# Patient Record
Sex: MALE | Race: WHITE | NOT HISPANIC OR LATINO | Employment: FULL TIME | URBAN - METROPOLITAN AREA
[De-identification: names, ages, dates, MRNs, and addresses within clinical notes are randomized per-mention and may not be internally consistent; named-entity substitution may affect disease eponyms.]

---

## 2017-01-27 ENCOUNTER — LAB VISIT (OUTPATIENT)
Dept: LAB | Facility: HOSPITAL | Age: 32
End: 2017-01-27
Attending: INTERNAL MEDICINE
Payer: COMMERCIAL

## 2017-01-27 ENCOUNTER — OFFICE VISIT (OUTPATIENT)
Dept: FAMILY MEDICINE | Facility: CLINIC | Age: 32
End: 2017-01-27
Payer: COMMERCIAL

## 2017-01-27 VITALS
HEIGHT: 73 IN | WEIGHT: 175.5 LBS | BODY MASS INDEX: 23.26 KG/M2 | HEART RATE: 76 BPM | TEMPERATURE: 98 F | SYSTOLIC BLOOD PRESSURE: 116 MMHG | DIASTOLIC BLOOD PRESSURE: 70 MMHG

## 2017-01-27 DIAGNOSIS — Z11.3 SCREENING FOR STD (SEXUALLY TRANSMITTED DISEASE): Primary | ICD-10-CM

## 2017-01-27 DIAGNOSIS — Z11.3 SCREENING FOR STD (SEXUALLY TRANSMITTED DISEASE): ICD-10-CM

## 2017-01-27 PROCEDURE — 99999 PR PBB SHADOW E&M-NEW PATIENT-LVL III: CPT | Mod: PBBFAC,,, | Performed by: INTERNAL MEDICINE

## 2017-01-27 PROCEDURE — 1159F MED LIST DOCD IN RCRD: CPT | Mod: S$GLB,,, | Performed by: INTERNAL MEDICINE

## 2017-01-27 PROCEDURE — 86695 HERPES SIMPLEX TYPE 1 TEST: CPT

## 2017-01-27 PROCEDURE — 86703 HIV-1/HIV-2 1 RESULT ANTBDY: CPT

## 2017-01-27 PROCEDURE — 87340 HEPATITIS B SURFACE AG IA: CPT

## 2017-01-27 PROCEDURE — 86695 HERPES SIMPLEX TYPE 1 TEST: CPT | Mod: 59

## 2017-01-27 PROCEDURE — 86803 HEPATITIS C AB TEST: CPT

## 2017-01-27 PROCEDURE — 99203 OFFICE O/P NEW LOW 30 MIN: CPT | Mod: S$GLB,,, | Performed by: INTERNAL MEDICINE

## 2017-01-27 PROCEDURE — 87591 N.GONORRHOEAE DNA AMP PROB: CPT

## 2017-01-27 PROCEDURE — 36415 COLL VENOUS BLD VENIPUNCTURE: CPT | Mod: PO

## 2017-01-27 PROCEDURE — 86592 SYPHILIS TEST NON-TREP QUAL: CPT

## 2017-01-27 PROCEDURE — 86696 HERPES SIMPLEX TYPE 2 TEST: CPT | Mod: 59

## 2017-01-27 PROCEDURE — 86706 HEP B SURFACE ANTIBODY: CPT

## 2017-01-27 NOTE — PROGRESS NOTES
Subjective:        Patient ID: Lei Estrada is a 31 y.o. male.    Chief Complaint: Rash (penis area) and wants std testing    HPI   Lei Estrada presents for STI testing.  Pt reports he had a rash on his penis that consisted of small red dots.  They looked like little pimples but were not raised.  He denies vesicles, ulcers, drainage, bleeding.  They were mildly tender but he denies pain, itching.  He denies penile discharge, dysuria, blood in urine, cloudy urine.  The rash resolved spontaneously after about 1 week.  This has happened twice.    Pt is sexually active with his girlfriend who recently had a red itchy rash along her lower abdomen for which she was given an antifungal cream by her doctor.  Pt was last tested for STIs 1 year ago, including HSV, and everything was negative per his report.    Currently the rash is resolved.    Review of Systems  as per HPI      Objective:        Vitals:    01/27/17 1135   BP: 116/70   Pulse: 76   Temp: 97.9 °F (36.6 °C)     Physical Exam   Constitutional: He is oriented to person, place, and time. He appears well-developed and well-nourished. No distress.   Cardiovascular: Normal rate.    Pulmonary/Chest: Effort normal. No respiratory distress.   Neurological: He is alert and oriented to person, place, and time.   Psychiatric: He has a normal mood and affect. His behavior is normal. Judgment and thought content normal.   Vitals reviewed.          Assessment:         1. Screening for STD (sexually transmitted disease)              Plan:         Lei was seen today for rash and wants std testing.    Diagnoses and all orders for this visit:    Screening for STD (sexually transmitted disease): STI testing today.  -     C. trachomatis/N. gonorrhoeae by AMP DNA Urine  -     HIV-1 and HIV-2 antibodies; Future  -     HERPES SIMPLEX 1 & 2 IGM; Future  -     HERPES SIMPLEX 1&2 IGG; Future  -     RPR; Future  -     Hepatitis B surface antibody; Future  -     Hepatitis B surface antigen;  Future  -     Hepatitis C antibody; Future            Follow up pending lab results.

## 2017-01-27 NOTE — MR AVS SNAPSHOT
"    [facility]  [address] [facility], [address]  [facility] [address]  Phone: [phone]  Fax: [phone]                  Lei Estrada   2017 11:40 AM   Office Visit    Description:  Male : 1985   Provider:  Sonia Landa MD   Department:  [facility]           Reason for Visit     Rash     wants std testing           Diagnoses this Visit        Comments    Screening for STD (sexually transmitted disease)    -  Primary            To Do List           Goals (5 Years of Data)     None      Follow-Up and Disposition     Return for Follow up pending lab results.      [facility]     [facility] -  Assistance  Registered nurses in the [facility] provide clinical advisement, health education, appointment booking, and other advisory services.  Call for this free service at [phone].             Medications           Message regarding Medications     Verify the changes and/or additions to your medication regime listed below are the same as discussed with your clinician today.  If any of these changes or additions are incorrect, please notify your healthcare provider.             Verify that the below list of medications is an accurate representation of the medications you are currently taking.  If none reported, the list may be blank. If incorrect, please contact your healthcare provider. Carry this list with you in case of emergency.                Clinical Reference Information           Vital Signs - Last Recorded  Most recent update: 2017 11:38 AM by Neha Hinojosa MA    BP Pulse Temp Ht Wt BMI    116/70 (BP Location: Right arm) 76 97.9 °F (36.6 °C) 6' 1" (1.854 m) 79.6 kg (175 lb 7.8 oz) 23.15 kg/m2      Blood Pressure          Most Recent Value    BP  116/70      Allergies as of 2017     No Known Allergies      Immunizations Administered on Date of Encounter - 2017     None      Orders Placed During " Today's Visit      Normal Orders This Visit    C. trachomatis/N. gonorrhoeae by AMP DNA Urine     Future Labs/Procedures Expected by Expires    Hepatitis B surface antibody  1/27/2017 3/28/2018    Hepatitis B surface antigen  1/27/2017 3/28/2018    Hepatitis C antibody  1/27/2017 3/28/2018    HERPES SIMPLEX 1 & 2 IGM  1/27/2017 3/28/2018    HERPES SIMPLEX 1&2 IGG  1/27/2017 3/28/2018    HIV-1 and HIV-2 antibodies  1/27/2017 3/28/2018    RPR  1/27/2017 3/28/2018

## 2017-01-28 LAB — RPR SER QL: NORMAL

## 2017-01-30 LAB
C TRACH DNA SPEC QL NAA+PROBE: NEGATIVE
HBV SURFACE AB SER-ACNC: NEGATIVE M[IU]/ML
HBV SURFACE AG SERPL QL IA: NEGATIVE
HCV AB SERPL QL IA: NEGATIVE
HIV 1+2 AB+HIV1 P24 AG SERPL QL IA: NEGATIVE
N GONORRHOEA DNA SPEC QL NAA+PROBE: NEGATIVE

## 2017-01-31 LAB
HSV1 IGG SERPL QL IA: POSITIVE
HSV2 IGG SERPL QL IA: NEGATIVE

## 2017-02-01 LAB — HSV1+2 IGM SER IA-ACNC: <0.9 INDEX

## 2017-04-28 ENCOUNTER — PATIENT MESSAGE (OUTPATIENT)
Dept: FAMILY MEDICINE | Facility: CLINIC | Age: 32
End: 2017-04-28

## 2017-04-28 DIAGNOSIS — A60.00 GENITAL HERPES SIMPLEX, UNSPECIFIED SITE: Primary | ICD-10-CM

## 2017-04-28 RX ORDER — VALACYCLOVIR HYDROCHLORIDE 500 MG/1
TABLET, FILM COATED ORAL
Qty: 30 TABLET | Refills: 3 | Status: SHIPPED | OUTPATIENT
Start: 2017-04-28

## 2018-12-05 ENCOUNTER — OFFICE VISIT (OUTPATIENT)
Dept: FAMILY MEDICINE | Facility: CLINIC | Age: 33
End: 2018-12-05
Payer: COMMERCIAL

## 2018-12-05 VITALS
HEIGHT: 73 IN | WEIGHT: 174.81 LBS | TEMPERATURE: 99 F | SYSTOLIC BLOOD PRESSURE: 120 MMHG | BODY MASS INDEX: 23.17 KG/M2 | DIASTOLIC BLOOD PRESSURE: 70 MMHG | HEART RATE: 62 BPM

## 2018-12-05 DIAGNOSIS — L72.0 INCLUSION CYST: Primary | ICD-10-CM

## 2018-12-05 DIAGNOSIS — Z00.00 ROUTINE GENERAL MEDICAL EXAMINATION AT A HEALTH CARE FACILITY: ICD-10-CM

## 2018-12-05 PROCEDURE — 3008F BODY MASS INDEX DOCD: CPT | Mod: CPTII,S$GLB,, | Performed by: FAMILY MEDICINE

## 2018-12-05 PROCEDURE — 99203 OFFICE O/P NEW LOW 30 MIN: CPT | Mod: S$GLB,,, | Performed by: FAMILY MEDICINE

## 2018-12-05 PROCEDURE — 99999 PR PBB SHADOW E&M-EST. PATIENT-LVL III: CPT | Mod: PBBFAC,,, | Performed by: FAMILY MEDICINE

## 2018-12-05 NOTE — PROGRESS NOTES
"Subjective:     Patient ID: Lei Estrada is a 33 y.o. male.    Chief Complaint: Mass (right testicle)    HPI mass of external right testicle present for 2 years- occasionally it gets irritated or itchy. Not getting larger,   Review of Systems  per HPI  Objective:      Physical Exam   Constitutional: He appears well-developed and well-nourished.   Genitourinary: Penis normal.   Genitourinary Comments: Small 2 mm inclusion cyst of rt scrotum skin.   Re-assured pt this appears benign and does not appear to be a wart /condyloma.    Nursing note and vitals reviewed.      Assessment:     Lei was seen today for mass.    Diagnoses and all orders for this visit:    Inclusion cyst    Routine general medical examination at a health care facility  -     CBC auto differential; Future  -     Comprehensive metabolic panel; Future  -     Lipid panel; Future  -     TSH; Future  -     HIV 1/2 Ag/Ab (4th Gen); Future    labs ordered for a PE. Recommend he have labs done and then return for a full PE at his convenience.   I also offered for him to return and I could remove the inclusion cyst  Although I explained that he could most likely open this on his own with a sterile need and gently de -roof the lesion and the "jon" would most likely pop out . He feels comfortable trying on his own to remove it.   "

## 2024-05-28 ENCOUNTER — OFFICE VISIT (OUTPATIENT)
Dept: PRIMARY CARE CLINIC | Facility: CLINIC | Age: 39
End: 2024-05-28
Payer: COMMERCIAL

## 2024-05-28 VITALS
HEIGHT: 74 IN | WEIGHT: 189.13 LBS | SYSTOLIC BLOOD PRESSURE: 120 MMHG | TEMPERATURE: 98 F | HEART RATE: 68 BPM | BODY MASS INDEX: 24.27 KG/M2 | DIASTOLIC BLOOD PRESSURE: 62 MMHG | OXYGEN SATURATION: 97 %

## 2024-05-28 DIAGNOSIS — Z00.00 ROUTINE GENERAL MEDICAL EXAMINATION AT A HEALTH CARE FACILITY: ICD-10-CM

## 2024-05-28 DIAGNOSIS — F33.1 MODERATE RECURRENT MAJOR DEPRESSION: Primary | ICD-10-CM

## 2024-05-28 DIAGNOSIS — Z13.31 POSITIVE DEPRESSION SCREENING: ICD-10-CM

## 2024-05-28 PROCEDURE — 99999 PR PBB SHADOW E&M-EST. PATIENT-LVL III: CPT | Mod: PBBFAC,,, | Performed by: FAMILY MEDICINE

## 2024-05-28 PROCEDURE — 1160F RVW MEDS BY RX/DR IN RCRD: CPT | Mod: CPTII,S$GLB,, | Performed by: FAMILY MEDICINE

## 2024-05-28 PROCEDURE — 3008F BODY MASS INDEX DOCD: CPT | Mod: CPTII,S$GLB,, | Performed by: FAMILY MEDICINE

## 2024-05-28 PROCEDURE — 1159F MED LIST DOCD IN RCRD: CPT | Mod: CPTII,S$GLB,, | Performed by: FAMILY MEDICINE

## 2024-05-28 PROCEDURE — 99203 OFFICE O/P NEW LOW 30 MIN: CPT | Mod: S$GLB,,, | Performed by: FAMILY MEDICINE

## 2024-05-28 PROCEDURE — 3074F SYST BP LT 130 MM HG: CPT | Mod: CPTII,S$GLB,, | Performed by: FAMILY MEDICINE

## 2024-05-28 PROCEDURE — 3078F DIAST BP <80 MM HG: CPT | Mod: CPTII,S$GLB,, | Performed by: FAMILY MEDICINE

## 2024-05-28 RX ORDER — BUPROPION HYDROCHLORIDE 150 MG/1
150 TABLET ORAL DAILY
Qty: 30 TABLET | Refills: 11 | Status: SHIPPED | OUTPATIENT
Start: 2024-05-28 | End: 2025-05-28

## 2024-05-28 NOTE — ASSESSMENT & PLAN NOTE
I have reviewed the positive depression score which warrants active treatment with psychotherapy and/or medications.    Start Wellbutrin 150 daily

## 2024-05-28 NOTE — PROGRESS NOTES
"      Assessment:     1. Moderate recurrent major depression    2. Positive depression screening          Plan:     Moderate recurrent major depression  Multifactorial,  from partner of 12 yrs, but feels has tended toward mild depression since young, mild difficulty w focus , "white knuckled" through things. Never hospitalized, no plan suicide. Does smoke & drink but not daily, does not affect his ability to hold a job. Never took med, open to try now after recommendation by his therapist    Continue w Therapist Fartun Wright q wk   [place] [place] [place]    Start Wellbutrin 150 daily  See me in 1-2 mo w labs prior for general physical    Positive depression screening  I have reviewed the positive depression score which warrants active treatment with psychotherapy and/or medications.    Start Wellbutrin 150 daily      New pt, 19 min spent in discussion his symptoms & medical treatment options      HPI: Lei Estrada is a 38 y.o. male with is here today for discussion on focus & feeling down    MEDS : none    No results found for: "HGBA1C"  No results found for: "MICALBCREAT"  No results found for: "LDLCALC", "CHOL", "HDL", "TRIG"    Lab Results   Component Value Date     (L) 04/27/2021    K 3.8 04/27/2021    CO2 29 04/27/2021    BUN 7.0 (L) 04/27/2021    CREATININE 0.93 04/27/2021    CALCIUM 9.1 04/27/2021    ALBUMIN 4.8 04/27/2021    BILITOT 0.9 04/27/2021    AST 30 04/27/2021    ALT 19 04/27/2021    ANIONGAP 5 (L) 04/27/2021    ESTGFRAFRICA >105 04/27/2021    HEPCAB Negative 01/27/2017       No results found for: "LH", "FSH", "TOTALTESTOST", "PROGESTERONE", "ESTRADIOL", "[id number]", "EOLVSHOX81", "FERRITIN", "IRON", "TRANSFERRIN", "TIBC", "FESATURATED", "ZINC"      Past Medical History:   Diagnosis Date    Asthma      History reviewed. No pertinent surgical history.  Vitals:    05/28/24 0908   BP: 120/62   Pulse: 68   Temp: 98.1 °F (36.7 °C)   TempSrc: Oral   SpO2: 97%   Weight: 85.8 kg (189 " "lb 2.5 oz)   Height: 6' 2" (1.88 m)   PainSc: 0-No pain     Wt Readings from Last 5 Encounters:   05/28/24 85.8 kg (189 lb 2.5 oz)   12/05/18 79.3 kg (174 lb 13.2 oz)   01/27/17 79.6 kg (175 lb 7.8 oz)     Objective:   Physical Exam  Psychiatric:         Mood and Affect: Mood normal.         Behavior: Behavior normal.         Thought Content: Thought content normal.         Judgment: Judgment normal.                                     "

## 2024-05-28 NOTE — ASSESSMENT & PLAN NOTE
"Multifactorial,  from partner of 12 yrs, but feels has tended toward mild depression since young, mild difficulty w focus , "white knuckled" through things. Never hospitalized, no plan suicide. Does smoke & drink but not daily, does not affect his ability to hold a job. Never took med, open to try now after recommendation by his therapist    Continue w Therapist Fartun Wright q wk   [place]    Start Wellbutrin 150 daily  See me in 1-2 mo w labs prior for general physical  "

## 2024-06-10 ENCOUNTER — LAB VISIT (OUTPATIENT)
Dept: LAB | Facility: HOSPITAL | Age: 39
End: 2024-06-10
Attending: FAMILY MEDICINE
Payer: COMMERCIAL

## 2024-06-10 DIAGNOSIS — Z00.00 ROUTINE GENERAL MEDICAL EXAMINATION AT A HEALTH CARE FACILITY: ICD-10-CM

## 2024-06-10 LAB
ALT SERPL W/O P-5'-P-CCNC: 17 U/L (ref 10–44)
ANION GAP SERPL CALC-SCNC: 10 MMOL/L (ref 8–16)
AST SERPL-CCNC: 21 U/L (ref 10–40)
BUN SERPL-MCNC: 10 MG/DL (ref 6–20)
CALCIUM SERPL-MCNC: 9.7 MG/DL (ref 8.7–10.5)
CHLORIDE SERPL-SCNC: 104 MMOL/L (ref 95–110)
CHOLEST SERPL-MCNC: 143 MG/DL (ref 120–199)
CHOLEST/HDLC SERPL: 2.8 {RATIO} (ref 2–5)
CO2 SERPL-SCNC: 25 MMOL/L (ref 23–29)
CREAT SERPL-MCNC: 0.9 MG/DL (ref 0.5–1.4)
ERYTHROCYTE [DISTWIDTH] IN BLOOD BY AUTOMATED COUNT: 11.8 % (ref 11.5–14.5)
EST. GFR  (NO RACE VARIABLE): >60 ML/MIN/1.73 M^2
GLUCOSE SERPL-MCNC: 87 MG/DL (ref 70–110)
HCT VFR BLD AUTO: 42.1 % (ref 40–54)
HDLC SERPL-MCNC: 51 MG/DL (ref 40–75)
HDLC SERPL: 35.7 % (ref 20–50)
HGB BLD-MCNC: 13.8 G/DL (ref 14–18)
LDLC SERPL CALC-MCNC: 81.2 MG/DL (ref 63–159)
MCH RBC QN AUTO: 31.4 PG (ref 27–31)
MCHC RBC AUTO-ENTMCNC: 32.8 G/DL (ref 32–36)
MCV RBC AUTO: 96 FL (ref 82–98)
NONHDLC SERPL-MCNC: 92 MG/DL
PLATELET # BLD AUTO: 268 K/UL (ref 150–450)
PMV BLD AUTO: 11.9 FL (ref 9.2–12.9)
POTASSIUM SERPL-SCNC: 4.2 MMOL/L (ref 3.5–5.1)
RBC # BLD AUTO: 4.4 M/UL (ref 4.6–6.2)
SODIUM SERPL-SCNC: 139 MMOL/L (ref 136–145)
TRIGL SERPL-MCNC: 54 MG/DL (ref 30–150)
WBC # BLD AUTO: 5 K/UL (ref 3.9–12.7)

## 2024-06-10 PROCEDURE — 85027 COMPLETE CBC AUTOMATED: CPT | Performed by: FAMILY MEDICINE

## 2024-06-10 PROCEDURE — 80048 BASIC METABOLIC PNL TOTAL CA: CPT | Performed by: FAMILY MEDICINE

## 2024-06-10 PROCEDURE — 84450 TRANSFERASE (AST) (SGOT): CPT | Performed by: FAMILY MEDICINE

## 2024-06-10 PROCEDURE — 84460 ALANINE AMINO (ALT) (SGPT): CPT | Performed by: FAMILY MEDICINE

## 2024-06-10 PROCEDURE — 36415 COLL VENOUS BLD VENIPUNCTURE: CPT | Mod: PN | Performed by: FAMILY MEDICINE

## 2024-06-10 PROCEDURE — 80061 LIPID PANEL: CPT | Performed by: FAMILY MEDICINE

## 2024-06-20 ENCOUNTER — OFFICE VISIT (OUTPATIENT)
Dept: PRIMARY CARE CLINIC | Facility: CLINIC | Age: 39
End: 2024-06-20
Payer: COMMERCIAL

## 2024-06-20 VITALS
HEART RATE: 73 BPM | HEIGHT: 74 IN | SYSTOLIC BLOOD PRESSURE: 110 MMHG | WEIGHT: 186.5 LBS | OXYGEN SATURATION: 98 % | TEMPERATURE: 99 F | BODY MASS INDEX: 23.93 KG/M2 | DIASTOLIC BLOOD PRESSURE: 68 MMHG

## 2024-06-20 DIAGNOSIS — Z00.00 ROUTINE GENERAL MEDICAL EXAMINATION AT A HEALTH CARE FACILITY: Primary | ICD-10-CM

## 2024-06-20 DIAGNOSIS — F33.1 MODERATE RECURRENT MAJOR DEPRESSION: ICD-10-CM

## 2024-06-20 PROBLEM — Z13.31 POSITIVE DEPRESSION SCREENING: Status: RESOLVED | Noted: 2024-05-28 | Resolved: 2024-06-20

## 2024-06-20 PROCEDURE — 99999 PR PBB SHADOW E&M-EST. PATIENT-LVL III: CPT | Mod: PBBFAC,,, | Performed by: FAMILY MEDICINE

## 2024-06-20 PROCEDURE — 3074F SYST BP LT 130 MM HG: CPT | Mod: CPTII,S$GLB,, | Performed by: FAMILY MEDICINE

## 2024-06-20 PROCEDURE — 3078F DIAST BP <80 MM HG: CPT | Mod: CPTII,S$GLB,, | Performed by: FAMILY MEDICINE

## 2024-06-20 PROCEDURE — 3008F BODY MASS INDEX DOCD: CPT | Mod: CPTII,S$GLB,, | Performed by: FAMILY MEDICINE

## 2024-06-20 PROCEDURE — 1160F RVW MEDS BY RX/DR IN RCRD: CPT | Mod: CPTII,S$GLB,, | Performed by: FAMILY MEDICINE

## 2024-06-20 PROCEDURE — 99395 PREV VISIT EST AGE 18-39: CPT | Mod: S$GLB,,, | Performed by: FAMILY MEDICINE

## 2024-06-20 PROCEDURE — 1159F MED LIST DOCD IN RCRD: CPT | Mod: CPTII,S$GLB,, | Performed by: FAMILY MEDICINE

## 2024-06-20 RX ORDER — BUPROPION HYDROCHLORIDE 300 MG/1
300 TABLET ORAL DAILY
Qty: 30 TABLET | Refills: 11 | Status: SHIPPED | OUTPATIENT
Start: 2024-06-20 | End: 2025-06-20

## 2024-06-20 NOTE — ASSESSMENT & PLAN NOTE
"Multifactorial,  from partner of 12 yrs, but feels has tended toward mild depression since young, mild difficulty w focus , "white knuckled" through things. Never hospitalized, no plan suicide. Does smoke & drink but not daily, does not affect his ability to hold a job. Never took med, open to try now after recommendation by his therapist    Continue w Therapist Fartun Wright q wk   [place]     Wellbutrin 150 daily works ok, but not noticed much difference, but having more insomnia, awakening more (usually 11-12 - 6 am). But unsure if it's side effect    Increase Wellbutrin 300 daily. If not better, consider Virtual visit to discuss SSRI med    Asking about focus --    ============================================  TIPS FOR ADD:  ============================================    Supplements to try :  OMEGA 3 FATTY ACIDS (fish oil) boosts DOPAMINE  the brain neurotransmitter needed for  working memory, behavior, motivation. Foods w this - Fish, Walnuts, Flaxseed, Basil, Eggs, Brussel Sprouts, Seaweed, Soybeans, Spinach, Canola Oil, Broccoli, Cashews    MAGNESIUM 400 nightly can help with relaxation. Ex - Chillax by [employer] brand    FANTASTIC BOOK (it's old so get an inexpensive used one online)= ADD Success Stories by Neil Ivory  Visual Learning  The Creative Act : a way of being, [employer] producer    Try stopping one of these for a week at a time & see how your brain responds -  Cut out sugar, carbohydrates, processed foods. [email or web address] podcast on "Eating for ADD"    Spend 5- 10 min a day making a Daily To-Do list    Try the Pomodoro technique (look it up) of focused work for 25 min, then take 5 min break    Get restful uninterrupted SLEEP every night     WALK IN NATURE 10 minutes EVERY day. More daily exercise benefits you exponentially.    On your phone, under settings, TURN OFF ALL NOTIFICATIONS     Keep your PHONE FACE DOWN when you're studying or working or charging it,  " "as not to be distracted on the task in front of you.     TRY THIS & SEE HOW PRODUCTIVE YOU ARE - Under Settings, SCREEN TIME, limit social media & texting to ONE HOUR a day.     Be aware of your total screen time that your spend on your phone on a daily basis. If you're trying to complete a task, be aware that you were distracted the amount of time that you were on your phone.     Avoid alcohol, marijuana, vaping, tobacco, drugs - these all hit the "reward center" of your brain but ultimately depresses your brain function  ===========================================    If you'd like to try a nonstimulant/ noncontrolled medicine for ADD  , like  Strattera & Wellbutrin  we can try this    ======    SLEEP HYGIENE-   After 3 pm - Avoid caffeine (coffee, tea, soft drinks ,energy drinks)   After 6 pm - Avoid liquids (so you're not waking up to urinate)  After 8 pm - Avoid screens that emit light & stimulate your brain. Don't look at phone, computer or TV  It's ok to listen to background noise machine (waves, rain, audio books where voice is low)  Keep your room dark & use an alarm clock that isn't bright.   Do not take a nap  Wake up every morning & step out of the door. Walk for 15 min, then turn around & walk home.  EVERY DAY . This helps to  Exhaust your body  to sleep    Try to stay in your bed for 7-8 hours per night, instead of getting out of bed (which awakens your body when you're standing upright &  wakes up your brain, affecting your normal circadian rhythm)  ================    "

## 2024-06-20 NOTE — PROGRESS NOTES
"      Assessment:     1. Routine general medical examination at a health care facility    2. Moderate recurrent major depression      Plan:     Routine general medical examination at a health care facility  MOVE more, sit less  Eat more food grown from the earth (picked from trees or out of the ground)  High fiber, good fat (avocado, olive oil, nuts) foods  If you urinate more than 2 times a night (prostate symptoms) let me know  COLON CANCER screening starting at 46 yo  Follow up yearly with LABS ONE WEEK PRIOR so we can discuss at your visit      Moderate recurrent major depression  Multifactorial,  from partner of 12 yrs, but feels has tended toward mild depression since young, mild difficulty w focus , "white knuckled" through things. Never hospitalized, no plan suicide. Does smoke & drink but not daily, does not affect his ability to hold a job. Never took med, open to try now after recommendation by his therapist    Continue w Therapist Fartun Wright q wk   [place] [place]     Wellbutrin 150 daily works ok, but not noticed much difference, but having more insomnia, awakening more (usually 11-12 - 6 am). But unsure if it's side effect    Increase Wellbutrin 300 daily. If not better, consider Virtual visit to discuss SSRI med    Asking about focus --    ============================================  TIPS FOR ADD:  ============================================    Supplements to try :  OMEGA 3 FATTY ACIDS (fish oil) boosts DOPAMINE  the brain neurotransmitter needed for  working memory, behavior, motivation. Foods w this - Fish, Walnuts, Flaxseed, Basil, Eggs, Brussel Sprouts, Seaweed, Soybeans, Spinach, Canola Oil, Broccoli, Cashews    MAGNESIUM 400 nightly can help with relaxation. Ex - Chillax by [employer] brand    FANTASTIC BOOK (it's old so get an inexpensive used one online)= ADD Success Stories by Neil Ivory  Visual Learning  The Creative Act : a way of being, Timothy Siegel music " "    Try stopping one of these for a week at a time & see how your brain responds -  Cut out sugar, carbohydrates, processed foods. [email or web address] podcast on "Eating for ADD"    Spend 5- 10 min a day making a Daily To-Do list    Try the Pomodoro technique (look it up) of focused work for 25 min, then take 5 min break    Get restful uninterrupted SLEEP every night     WALK IN NATURE 10 minutes EVERY day. More daily exercise benefits you exponentially.    On your phone, under settings, TURN OFF ALL NOTIFICATIONS     Keep your PHONE FACE DOWN when you're studying or working or charging it,  as not to be distracted on the task in front of you.     TRY THIS & SEE HOW PRODUCTIVE YOU ARE - Under Settings, SCREEN TIME, limit social media & texting to ONE HOUR a day.     Be aware of your total screen time that your spend on your phone on a daily basis. If you're trying to complete a task, be aware that you were distracted the amount of time that you were on your phone.     Avoid alcohol, marijuana, vaping, tobacco, drugs - these all hit the "reward center" of your brain but ultimately depresses your brain function  ===========================================    If you'd like to try a nonstimulant/ noncontrolled medicine for ADD  , like  Strattera & Wellbutrin  we can try this    ======    SLEEP HYGIENE-   After 3 pm - Avoid caffeine (coffee, tea, soft drinks ,energy drinks)   After 6 pm - Avoid liquids (so you're not waking up to urinate)  After 8 pm - Avoid screens that emit light & stimulate your brain. Don't look at phone, computer or TV  It's ok to listen to background noise machine (waves, rain, audio books where voice is low)  Keep your room dark & use an alarm clock that isn't bright.   Do not take a nap  Wake up every morning & step out of the door. Walk for 15 min, then turn around & walk home.  EVERY DAY . This helps to  Exhaust your body  to sleep    Try to stay in your bed for 7-8 hours per night, " "instead of getting out of bed (which awakens your body when you're standing upright &  wakes up your brain, affecting your normal circadian rhythm)  ================          HPI: Lei Estrada is a 38 y.o. male with is here today for general exam.     Denies chest pain, shortness of breath    Current Outpatient Medications   Medication Instructions    buPROPion (WELLBUTRIN XL) 300 mg, Oral, Daily       No results found for: "HGBA1C"  No results found for: "MICALBCREAT"  Lab Results   Component Value Date    LDLCALC 81.2 06/10/2024    CHOL 143 06/10/2024    HDL 51 06/10/2024    TRIG 54 06/10/2024       Lab Results   Component Value Date     06/10/2024    K 4.2 06/10/2024     06/10/2024    CO2 25 06/10/2024    GLU 87 06/10/2024    BUN 10 06/10/2024    CREATININE 0.9 06/10/2024    CALCIUM 9.7 06/10/2024    ALBUMIN 4.8 04/27/2021    BILITOT 0.9 04/27/2021    AST 21 06/10/2024    ALT 17 06/10/2024    ANIONGAP 10 06/10/2024    ESTGFRAFRICA >105 04/27/2021    WBC 5.00 06/10/2024    HGB 13.8 (L) 06/10/2024    HCT 42.1 06/10/2024    MCV 96 06/10/2024     06/10/2024    HEPCAB Negative 01/27/2017       No results found for: "LH", "FSH", "TOTALTESTOST", "PROGESTERONE", "ESTRADIOL", "NEALOGST14LO", "[id number]", "FERRITIN", "IRON", "TRANSFERRIN", "TIBC", "FESATURATED", "ZINC"      Past Medical History:   Diagnosis Date    Asthma      History reviewed. No pertinent surgical history.  Vitals:    06/20/24 1404   BP: 110/68   Pulse: 73   Temp: 98.7 °F (37.1 °C)   TempSrc: Oral   SpO2: 98%   Weight: 84.6 kg (186 lb 8.2 oz)   Height: 6' 2" (1.88 m)   PainSc:   2   PainLoc: Throat     Wt Readings from Last 5 Encounters:   06/20/24 84.6 kg (186 lb 8.2 oz)   05/28/24 85.8 kg (189 lb 2.5 oz)   12/05/18 79.3 kg (174 lb 13.2 oz)   01/27/17 79.6 kg (175 lb 7.8 oz)     Objective:   Physical Exam  Constitutional:       Appearance: He is well-developed.   Eyes:      Pupils: Pupils are equal, round, and reactive to light. "   Cardiovascular:      Rate and Rhythm: Normal rate and regular rhythm.      Heart sounds: Normal heart sounds. No murmur heard.  Pulmonary:      Effort: Pulmonary effort is normal.      Breath sounds: Normal breath sounds. No wheezing.   Abdominal:      General: Bowel sounds are normal. There is no distension.      Palpations: Abdomen is soft. There is no mass.      Tenderness: There is no abdominal tenderness. There is no guarding or rebound.   Musculoskeletal:      Cervical back: Neck supple.   Skin:     General: Skin is warm and dry.   Neurological:      Mental Status: He is alert.   Psychiatric:         Behavior: Behavior normal.

## 2024-09-23 PROBLEM — Z00.00 ROUTINE GENERAL MEDICAL EXAMINATION AT A HEALTH CARE FACILITY: Status: RESOLVED | Noted: 2024-06-20 | Resolved: 2024-09-23

## 2025-08-09 DIAGNOSIS — Z00.00 ROUTINE GENERAL MEDICAL EXAMINATION AT A HEALTH CARE FACILITY: Primary | ICD-10-CM

## 2025-08-12 RX ORDER — BUPROPION HYDROCHLORIDE 300 MG/1
TABLET ORAL
Qty: 30 TABLET | Refills: 0 | Status: SHIPPED | OUTPATIENT
Start: 2025-08-12